# Patient Record
Sex: FEMALE | Race: BLACK OR AFRICAN AMERICAN | NOT HISPANIC OR LATINO
[De-identification: names, ages, dates, MRNs, and addresses within clinical notes are randomized per-mention and may not be internally consistent; named-entity substitution may affect disease eponyms.]

---

## 2019-08-19 PROBLEM — Z00.00 ENCOUNTER FOR PREVENTIVE HEALTH EXAMINATION: Status: ACTIVE | Noted: 2019-08-19

## 2019-08-23 ENCOUNTER — APPOINTMENT (OUTPATIENT)
Dept: PHYSICAL MEDICINE AND REHAB | Facility: CLINIC | Age: 64
End: 2019-08-23
Payer: COMMERCIAL

## 2019-08-23 VITALS
HEIGHT: 66 IN | SYSTOLIC BLOOD PRESSURE: 114 MMHG | DIASTOLIC BLOOD PRESSURE: 77 MMHG | HEART RATE: 80 BPM | WEIGHT: 200 LBS | BODY MASS INDEX: 32.14 KG/M2

## 2019-08-23 DIAGNOSIS — M25.562 PAIN IN RIGHT KNEE: ICD-10-CM

## 2019-08-23 DIAGNOSIS — G43.909 MIGRAINE, UNSPECIFIED, NOT INTRACTABLE, W/OUT STATUS MIGRAINOSUS: ICD-10-CM

## 2019-08-23 DIAGNOSIS — G89.29 PAIN IN RIGHT KNEE: ICD-10-CM

## 2019-08-23 DIAGNOSIS — M25.561 PAIN IN RIGHT KNEE: ICD-10-CM

## 2019-08-23 DIAGNOSIS — Z87.898 PERSONAL HISTORY OF OTHER SPECIFIED CONDITIONS: ICD-10-CM

## 2019-08-23 PROCEDURE — 99204 OFFICE O/P NEW MOD 45 MIN: CPT

## 2019-08-23 RX ORDER — FLUOXETINE HCL 10 MG
10 TABLET ORAL
Refills: 0 | Status: ACTIVE | COMMUNITY

## 2019-08-23 RX ORDER — NABUMETONE 500 MG/1
500 TABLET, FILM COATED ORAL
Refills: 0 | Status: ACTIVE | COMMUNITY

## 2019-08-23 RX ORDER — CANDESARTAN CILEXETIL AND HYDROCHLOROTHIAZIDE 16; 12.5 MG/1; MG/1
16-12.5 TABLET ORAL
Refills: 0 | Status: ACTIVE | COMMUNITY

## 2019-08-23 NOTE — HISTORY OF PRESENT ILLNESS
[FreeTextEntry1] : female 64 year - old referred by MD\par for intractable migraine.\par \par TYPE: Intractable Grade 4  (346.71)\par \par Age of Onset: 13\par Bilateral:yes\par Getting Worse Last:last 12 months -had botox before here did well \par Duration of Headache:all day and now daily HA \par Frequency: Patient endorses 15+20 headaches a month\par Pain is:  Knife like, debilitating\par Positive Family History grandmother and sister \par MRI/CT: Brain in the past negative\par \par Associated Features:\par Nausea, Vomiting, Photophobia, Phonophobia, sensitive to light and sound and vibrations \par also dizzy and light headed \par \par Disability due to Migraine - Lost days from work +\par \par Other - Impact on family - relationships or ADL yes very bad last 6 months \par \par Experiencing increasing disability and lessening quality of life.\par \par Many medications have been tried and have not been effective or tolerated including: see list scanned -\par now taking candesartan daily \par 1. Abortives - NSAID's / Triptans / Ergot alkaloid derivatives\par 2. Prophylactics - Antidepressants / Beta Blockers / Ca Channel Blockers / Antiepileptics / Tylenol\par 3. Increasing dosing of maintenance or breakthrough medication\par 4. Vitamin B6, Magnesium, other over the counter\par 5. Use of narcotic medications when nothing else is working\par 6. Stress reduction exercise, acupuncture, avoidance of triggers \par \par Treatment regiments to date have not been effective in controlling symptoms and have caused intolerance with time or other.  \par \par Based on the clinical findings and presentation my conclusion is that this patient is an excellent candidate for OnabotulinumtoxinA (Botox). \par \par Discussed- frequent risks and benefits.  Frequent adverse reactions are dysphagia 19% (usually mild), URTI (12%), neck pain 11%, ptosis 20%, and malaise. Side effects only last days to weeks, and are self limiting. \par Rare complications include: anaphylaxis and bleeding. Explained possible distant spread of toxin effect.\par \par Botox injections for chronic intractable migraines (G43.019) are FDA approved and on label.  200 units are required with 155 units injected as per algorithm divided into 31 sites. Written informed consent will be obtained at the time of injection. Pregnancy and lactation preclude injection. \par \par Protocol\par -Patient reminded that effects of Botox take 5-7 days to take hold\par -Botox repeated every three months\par \par Contraindications: NONE \par \par -Aminoglycoside antibiotics concurrently\par -Neuro-muscular transmission disease or drugs\par -Pregnancy\par - Lactation\par - Neuro-muscular diseases \par - Neuro-transmitter drugs\par \par \par \par

## 2019-08-23 NOTE — ASSESSMENT
[FreeTextEntry1] : good candidate for med BOTOX \par will request 200 units \par \par also will work up knee iisues next visit when she returns from vacation \par given handout on this and botox

## 2019-08-23 NOTE — REVIEW OF SYSTEMS
[Joint Pain] : joint pain [Muscle Pain] : muscle pain [Joint Stiffness] : joint stiffness [Dizziness] : dizziness [Headache] : headache [Negative] : Heme/Lymph [Muscle Weakness] : no muscle weakness [Difficulty Walking] : no difficulty walking [FreeTextEntry9] : knee pain has OA bad lately

## 2019-08-23 NOTE — PHYSICAL EXAM
[FreeTextEntry1] : PHYSICAL EXAM : OBJECTIVE \par \par GENERAL : Awake ,alert and oriented to time place and person \par HEAD : normocephalic and atraumatic \par NECK : supple ,no tracheal deviation ,no thyroid enlargement noted with swallowing\par EYES : sclera and conjunctiva normal no redness,intact extraocular movements \par ENT  : ears and nose normal in appearance -hearing adequate \par PULMONARY: effort normal. No respiratory distress. breathing regular. No wheezes \par LYMPH : No swelling in limbs, capillary return within normal range \par CVS : warm extremities,no palpitations,not short of breath, no visible jugular venous distention\par PSYCH : mood and affect normal ,good eye contact ,normal attention \par ABDOMEN : no visible distension , \par NEUROLOGICAL:cranial nerves intact,muscle tone normal,gait and balance safe except where noted below \par SKIN : warm and dry No rash detected over specific body areas examined \par MUSCULOSKELETAL: normal muscle bulk, no focal bony tenderness /posture normal except where specified below\par knees tender with crepitus L>R heavy set

## 2019-08-30 ENCOUNTER — APPOINTMENT (OUTPATIENT)
Dept: PHYSICAL MEDICINE AND REHAB | Facility: CLINIC | Age: 64
End: 2019-08-30

## 2019-11-26 ENCOUNTER — APPOINTMENT (OUTPATIENT)
Dept: PHYSICAL MEDICINE AND REHAB | Facility: CLINIC | Age: 64
End: 2019-11-26
Payer: COMMERCIAL

## 2019-11-26 VITALS
WEIGHT: 200 LBS | HEART RATE: 80 BPM | SYSTOLIC BLOOD PRESSURE: 120 MMHG | BODY MASS INDEX: 32.14 KG/M2 | HEIGHT: 66 IN | DIASTOLIC BLOOD PRESSURE: 78 MMHG

## 2019-11-26 DIAGNOSIS — G43.719 CHRONIC MIGRAINE W/OUT AURA, INTRACTABLE, W/OUT STATUS MIGRAINOSUS: ICD-10-CM

## 2019-11-26 PROCEDURE — 64615 CHEMODENERV MUSC MIGRAINE: CPT

## 2019-11-26 RX ORDER — ONABOTULINUMTOXINA 200 [USP'U]/1
200 INJECTION, POWDER, LYOPHILIZED, FOR SOLUTION INTRADERMAL; INTRAMUSCULAR
Qty: 0 | Refills: 0 | Status: COMPLETED | OUTPATIENT
Start: 2019-11-26

## 2019-11-26 RX ADMIN — ONABOTULINUMTOXINA 200 UNIT: 200 INJECTION, POWDER, LYOPHILIZED, FOR SOLUTION INTRADERMAL; INTRAMUSCULAR at 00:00

## 2020-01-07 ENCOUNTER — APPOINTMENT (OUTPATIENT)
Dept: NEUROLOGY | Facility: CLINIC | Age: 65
End: 2020-01-07

## 2020-03-17 ENCOUNTER — APPOINTMENT (OUTPATIENT)
Dept: NEUROLOGY | Facility: CLINIC | Age: 65
End: 2020-03-17

## 2021-11-04 ENCOUNTER — OUTPATIENT (OUTPATIENT)
Dept: OUTPATIENT SERVICES | Facility: HOSPITAL | Age: 66
LOS: 1 days | End: 2021-11-04
Payer: MEDICARE

## 2021-11-04 ENCOUNTER — APPOINTMENT (OUTPATIENT)
Dept: MAMMOGRAPHY | Facility: HOSPITAL | Age: 66
End: 2021-11-04
Payer: MEDICARE

## 2021-11-04 ENCOUNTER — APPOINTMENT (OUTPATIENT)
Dept: ULTRASOUND IMAGING | Facility: HOSPITAL | Age: 66
End: 2021-11-04
Payer: MEDICARE

## 2021-11-04 PROCEDURE — 77067 SCR MAMMO BI INCL CAD: CPT | Mod: 26

## 2021-11-04 PROCEDURE — 76641 ULTRASOUND BREAST COMPLETE: CPT | Mod: 26,50

## 2021-11-04 PROCEDURE — 77067 SCR MAMMO BI INCL CAD: CPT

## 2021-11-04 PROCEDURE — 76641 ULTRASOUND BREAST COMPLETE: CPT

## 2021-11-04 PROCEDURE — 77063 BREAST TOMOSYNTHESIS BI: CPT

## 2021-11-04 PROCEDURE — 77063 BREAST TOMOSYNTHESIS BI: CPT | Mod: 26

## 2022-04-15 ENCOUNTER — APPOINTMENT (OUTPATIENT)
Dept: NEUROLOGY | Facility: CLINIC | Age: 67
End: 2022-04-15